# Patient Record
Sex: MALE | Race: ASIAN | Employment: OTHER | URBAN - METROPOLITAN AREA
[De-identification: names, ages, dates, MRNs, and addresses within clinical notes are randomized per-mention and may not be internally consistent; named-entity substitution may affect disease eponyms.]

---

## 2017-01-19 PROBLEM — G95.89 LUMBAR EPIDURAL MASS (HCC): Status: ACTIVE | Noted: 2017-01-19

## 2017-01-19 PROBLEM — M48.062 LUMBAR STENOSIS WITH NEUROGENIC CLAUDICATION: Status: ACTIVE | Noted: 2017-01-19

## 2017-03-15 ENCOUNTER — HOSPITAL ENCOUNTER (EMERGENCY)
Age: 67
Discharge: HOME | End: 2017-03-15
Attending: EMERGENCY MEDICINE
Payer: MEDICARE

## 2017-03-15 VITALS
BODY MASS INDEX: 33.6 KG/M2 | WEIGHT: 240 LBS | DIASTOLIC BLOOD PRESSURE: 98 MMHG | RESPIRATION RATE: 16 BRPM | HEART RATE: 99 BPM | SYSTOLIC BLOOD PRESSURE: 136 MMHG | OXYGEN SATURATION: 95 % | HEIGHT: 71 IN | TEMPERATURE: 98.3 F

## 2017-03-15 DIAGNOSIS — S61.219A LACERATION OF FINGER, INITIAL ENCOUNTER: Primary | ICD-10-CM

## 2017-03-15 PROCEDURE — 99282 EMERGENCY DEPT VISIT SF MDM: CPT

## 2017-03-15 PROCEDURE — 12002 RPR S/N/AX/GEN/TRNK2.6-7.5CM: CPT

## 2017-03-15 PROCEDURE — 6370000000 HC RX 637 (ALT 250 FOR IP): Performed by: EMERGENCY MEDICINE

## 2017-03-15 PROCEDURE — 2500000003 HC RX 250 WO HCPCS: Performed by: EMERGENCY MEDICINE

## 2017-03-15 RX ORDER — CEPHALEXIN 500 MG/1
500 CAPSULE ORAL ONCE
Status: COMPLETED | OUTPATIENT
Start: 2017-03-15 | End: 2017-03-15

## 2017-03-15 RX ORDER — LIDOCAINE HYDROCHLORIDE 10 MG/ML
5 INJECTION, SOLUTION INFILTRATION; PERINEURAL ONCE
Status: COMPLETED | OUTPATIENT
Start: 2017-03-15 | End: 2017-03-15

## 2017-03-15 RX ADMIN — LIDOCAINE HYDROCHLORIDE 5 ML: 10 INJECTION, SOLUTION INFILTRATION; PERINEURAL at 21:49

## 2017-03-15 RX ADMIN — CEPHALEXIN 500 MG: 500 CAPSULE ORAL at 22:04

## 2017-03-15 ASSESSMENT — PAIN DESCRIPTION - LOCATION: LOCATION: FINGER (COMMENT WHICH ONE)

## 2017-03-15 ASSESSMENT — PAIN DESCRIPTION - DESCRIPTORS: DESCRIPTORS: OTHER (COMMENT)

## 2017-03-15 ASSESSMENT — ENCOUNTER SYMPTOMS
ABDOMINAL PAIN: 0
EYE REDNESS: 0
EYE PAIN: 0
VOMITING: 0
SHORTNESS OF BREATH: 0
RHINORRHEA: 0
COLOR CHANGE: 0
COUGH: 0
BLOOD IN STOOL: 0

## 2017-03-15 ASSESSMENT — PAIN DESCRIPTION - FREQUENCY: FREQUENCY: CONTINUOUS

## 2017-03-15 ASSESSMENT — PAIN SCALES - GENERAL: PAINLEVEL_OUTOF10: 2

## 2017-03-15 ASSESSMENT — PAIN DESCRIPTION - PROGRESSION: CLINICAL_PROGRESSION: GRADUALLY WORSENING

## 2017-03-15 ASSESSMENT — PAIN DESCRIPTION - PAIN TYPE: TYPE: ACUTE PAIN

## 2017-03-15 ASSESSMENT — PAIN DESCRIPTION - ONSET: ONSET: SUDDEN

## 2017-03-15 ASSESSMENT — PAIN DESCRIPTION - ORIENTATION: ORIENTATION: RIGHT

## 2017-04-11 PROBLEM — G89.4 CHRONIC PAIN SYNDROME: Status: ACTIVE | Noted: 2017-04-11

## 2017-04-20 PROBLEM — M17.12 PRIMARY OSTEOARTHRITIS OF LEFT KNEE: Status: ACTIVE | Noted: 2017-04-20

## 2017-04-25 PROBLEM — M47.26 OSTEOARTHRITIS OF SPINE WITH RADICULOPATHY, LUMBAR REGION: Status: ACTIVE | Noted: 2017-04-25

## 2017-04-25 PROBLEM — M51.36 DEGENERATIVE DISC DISEASE, LUMBAR: Status: ACTIVE | Noted: 2017-04-25

## 2017-04-25 PROBLEM — M48.061 FORAMINAL STENOSIS OF LUMBAR REGION: Status: ACTIVE | Noted: 2017-04-25

## 2021-06-17 DIAGNOSIS — M48.061 FORAMINAL STENOSIS OF LUMBAR REGION: ICD-10-CM

## 2021-06-17 DIAGNOSIS — G89.4 CHRONIC PAIN SYNDROME: ICD-10-CM

## 2021-06-17 DIAGNOSIS — M47.26 OSTEOARTHRITIS OF SPINE WITH RADICULOPATHY, LUMBAR REGION: ICD-10-CM

## 2021-06-17 DIAGNOSIS — M48.061 SPINAL STENOSIS, LUMBAR REGION, WITHOUT NEUROGENIC CLAUDICATION: ICD-10-CM

## 2021-06-17 DIAGNOSIS — M17.12 PRIMARY OSTEOARTHRITIS OF LEFT KNEE: ICD-10-CM

## 2021-06-17 RX ORDER — HYDROCODONE BITARTRATE AND ACETAMINOPHEN 10; 325 MG/1; MG/1
1 TABLET ORAL
Qty: 70 TABLET | Refills: 0 | Status: SHIPPED | OUTPATIENT
Start: 2021-06-17 | End: 2021-07-20 | Stop reason: SDUPTHER

## 2021-06-17 RX ORDER — GABAPENTIN 600 MG/1
600 TABLET ORAL 2 TIMES DAILY
Qty: 60 TABLET | Refills: 0 | Status: SHIPPED | OUTPATIENT
Start: 2021-06-17 | End: 2021-07-20 | Stop reason: SDUPTHER

## 2021-06-22 ENCOUNTER — VIRTUAL VISIT (OUTPATIENT)
Dept: NEUROSURGERY | Age: 71
End: 2021-06-22
Payer: MEDICARE

## 2021-06-22 DIAGNOSIS — M47.26 OSTEOARTHRITIS OF SPINE WITH RADICULOPATHY, LUMBAR REGION: Primary | ICD-10-CM

## 2021-06-22 DIAGNOSIS — M25.512 ACUTE PAIN OF LEFT SHOULDER: ICD-10-CM

## 2021-06-22 PROCEDURE — 99442 PR PHYS/QHP TELEPHONE EVALUATION 11-20 MIN: CPT | Performed by: NURSE PRACTITIONER

## 2021-06-22 ASSESSMENT — ENCOUNTER SYMPTOMS
RESPIRATORY NEGATIVE: 1
BACK PAIN: 1
ABDOMINAL PAIN: 0
COLOR CHANGE: 0

## 2021-06-22 NOTE — PROGRESS NOTES
Lack of Transportation (Medical):  Lack of Transportation (Non-Medical):    Physical Activity:     Days of Exercise per Week:     Minutes of Exercise per Session:    Stress:     Feeling of Stress :    Social Connections:     Frequency of Communication with Friends and Family:     Frequency of Social Gatherings with Friends and Family:     Attends Religion Services:     Active Member of Clubs or Organizations:     Attends Club or Organization Meetings:     Marital Status:    Intimate Partner Violence:     Fear of Current or Ex-Partner:     Emotionally Abused:     Physically Abused:     Sexually Abused:        Current Outpatient Medications on File Prior to Visit   Medication Sig Dispense Refill    HYDROcodone-acetaminophen (Randine Ped)  MG per tablet Take 1 tablet by mouth every 8-12 hours as needed for Pain for up to 30 days. 70 tablet 0    gabapentin (NEURONTIN) 600 MG tablet Take 1 tablet by mouth 2 times daily for 30 days. 60 tablet 0    naloxone 4 MG/0.1ML LIQD nasal spray 1 spray by Nasal route as needed for Opioid Reversal 1 each 0    prednisoLONE acetate (PRED FORTE) 1 % ophthalmic suspension SHAKE LQ AND INT 1 GTT IN OD QID  2    trimethoprim-polymyxin b (POLYTRIM) 15538-2.1 UNIT/ML-% ophthalmic solution USE 1 GTT IN THE RIGHT EYE QID. BEGIN THE DAY BEFORE SURGERY  1    pioglitazone (ACTOS) 30 MG tablet       omeprazole (PRILOSEC) 40 MG delayed release capsule TK ONE C PO  QD  2    metFORMIN (GLUCOPHAGE) 1000 MG tablet TK ONE T PO BID WITH MEALS  2    lisinopril (PRINIVIL;ZESTRIL) 20 MG tablet TK 1 T PO QD  2    ketorolac (ACULAR) 0.5 % ophthalmic solution USE 1 GTT IN THE RIGHT EYE BID.  BEGIN 5 DAYS BEFORE SURGERY  2    ACCU-CHEK NAN PLUS strip TEST BID  3    fluticasone (FLONASE) 50 MCG/ACT nasal spray USE 2 SPRAYS IN EACH NOSTRIL QD  2    doxycycline hyclate (VIBRAMYCIN) 100 MG capsule TK 1 C PO QD  1    citalopram (CELEXA) 10 MG tablet       atorvastatin (LIPITOR) 20 MG tablet       warfarin (COUMADIN) 5 MG tablet Mondays and Fridays 7.5mg, rest of week 10mg       No current facility-administered medications on file prior to visit. He  tried physical therapy. Date of lastof last PT treatment:     Pt presents today for a f/u of his chronic low back pain and left shoulder pain. Can't lay on left side due to back and shoulder. He moved into a new house with his wife, recently remarried. Is living in [place] now and has a new primary care physician who has referred him to Ortho for his left shoulder. He did some lumbar PT earlier this year. Pain 4/10, and 10/10 without medication. Pt feels that bending/stooping makes the pain worse, and laying down makes the pain better. Pt feels his medication helps him function and improve his quality of life. Pt c/o longstanding but intermittent BLE radiating numbness and tingling. Denies recent falls, injuries or trauma. Pt denies new weakness. History of  brain tumor, CVA, PE and continues on chronic coumadin. Had appt with Dr. Fernando Curran  at [facility] for bilateral SI injections, somewhat helpful but short term only. Updated XR report of lumbar spine in Care Everywhere shows mild degenerative changes and intact fusion. No longer seeing Dr. Leeann Wright.         PSH: PLIF L4-5 and 5-S1 with Dr. Mary Flynn on 5/10/17;  back surgery with Dr. Leeann Wright August 2018,   hernia repair surgery in June 2018, bilateral knee replacement Dec 2018       Shoulder Pain   Pertinent negatives include no numbness. Review of Systems   Constitutional: Negative. Negative for activity change and unexpected weight change. HENT: Negative. Respiratory: Negative. Gastrointestinal: Negative for abdominal pain. Genitourinary: Negative. Musculoskeletal: Positive for back pain. Negative for gait problem, joint swelling, neck pain and neck stiffness. Left shoulder   Skin: Negative for color change and rash. Neurological: Negative.   Negative for dizziness, seizures, weakness, light-headedness, numbness and headaches. Psychiatric/Behavioral: Negative. Negative for sleep disturbance. Objective:     Vitals: There were no vitals taken for this visit. PHYSICAL EXAMINATION:    [x] Alert  [x] Oriented to person/place/time  [x] No apparent distress      [x] Mood and affect normal  [x] Recent and remote memory intact  [x] Judgement and insight normal     [] OTHER:      Due to this being a TeleHealth encounter, evaluation of the following organ systems is limited: Vitals/Constitutional/EENT/Resp/CV/GI//MS/Neuro/Skin/Heme-Lymph-Imm. Assessment:      Diagnosis Orders   1. Osteoarthritis of spine with radiculopathy, lumbar region     2. Acute pain of left shoulder         Plan:          No orders of the defined types were placed in this encounter.    -He is living [facility] about a 3-hour drive. he will check with his family physician for a pain management referral.  He understands that he will need to come here monthly and it is too far  -He just had his refill on 6/17/2021 so does not need it today  -We will talk to him next month  No orders of the defined types were placed in this encounter. Discussed options with the patient today. Anatomic model pathology was shown and reviewed with pt. All questions were answered. Relevant imaging reviewed, NDP reviewed and pain generators reviewed. Pt verbalized understanding and agrees with above plan. Will continue medications as they do help pt function with ADL and improve quality of life. Pt understands potential side effects from opioids such as constipation, dry mouth, dizziness, opioid use disorder, and overdose. Pt has failed non opioid therapy and decision was made to prescribe opioids to help with daily function and improve quality of life. OARRS was reviewed. UTOX from 1/2021 appropriate; ORT score = 0  Daily MME 23  This NP saw pt under direct supervision of Dr Elton Busby. Controlled Substances Monitoring: Follow up:  Return in about 4 weeks (around 7/20/2021) for review meds and reassess pain. Andres Stallinsg, MAI - CNP      >50% of 11 minute appointment was spent with discussion, addressing questions and concerns, including prognosis, treatment options, patient education, and recommendations. [id number]    Pursuant to the emergency declaration under the [facility], [facility] waiver authority and the [employer] Act, this Virtual  Visit was conducted, with patient's consent, to reduce the patient's risk of exposure to COVID-19 and provide continuity of care for an established patient. Services were provided through an audio discussion to substitute for in-person clinic visit.

## 2021-07-20 ENCOUNTER — VIRTUAL VISIT (OUTPATIENT)
Dept: NEUROSURGERY | Age: 71
End: 2021-07-20
Payer: MEDICARE

## 2021-07-20 DIAGNOSIS — M48.061 FORAMINAL STENOSIS OF LUMBAR REGION: ICD-10-CM

## 2021-07-20 DIAGNOSIS — M17.12 PRIMARY OSTEOARTHRITIS OF LEFT KNEE: ICD-10-CM

## 2021-07-20 DIAGNOSIS — G89.4 CHRONIC PAIN SYNDROME: ICD-10-CM

## 2021-07-20 DIAGNOSIS — M48.061 SPINAL STENOSIS, LUMBAR REGION, WITHOUT NEUROGENIC CLAUDICATION: ICD-10-CM

## 2021-07-20 DIAGNOSIS — M47.26 OSTEOARTHRITIS OF SPINE WITH RADICULOPATHY, LUMBAR REGION: ICD-10-CM

## 2021-07-20 PROCEDURE — 99441 PR PHYS/QHP TELEPHONE EVALUATION 5-10 MIN: CPT | Performed by: NURSE PRACTITIONER

## 2021-07-20 RX ORDER — HYDROCODONE BITARTRATE AND ACETAMINOPHEN 10; 325 MG/1; MG/1
1 TABLET ORAL
Qty: 70 TABLET | Refills: 0 | Status: SHIPPED | OUTPATIENT
Start: 2021-07-20 | End: 2021-08-18 | Stop reason: SDUPTHER

## 2021-07-20 RX ORDER — GABAPENTIN 600 MG/1
600 TABLET ORAL 2 TIMES DAILY
Qty: 60 TABLET | Refills: 0 | Status: SHIPPED | OUTPATIENT
Start: 2021-07-20 | End: 2021-08-18 | Stop reason: SDUPTHER

## 2021-07-20 ASSESSMENT — ENCOUNTER SYMPTOMS
BACK PAIN: 1
RESPIRATORY NEGATIVE: 1
COLOR CHANGE: 0
ABDOMINAL PAIN: 0

## 2021-07-20 NOTE — PROGRESS NOTES
Conchis Martinez  (1950)    7/20/2021    TELEHEALTH EVALUATION -- Audio Only (During XWBUP-62 public health emergency)    Due to [place] 19 outbreak, patient's office visit was converted to a virtual visit. Patient was contacted and agreed to proceed with a audio only telehealth service. Patient understands that this encounter is a billable visit and that insurance coverage and co-pays are up to their individual insurance plans. At the beginning of this telehealth encounter, I verified with the patient their name and date of birth. Verbal consent for telehealth encounter was obtained. Subjective:       Chief Complaint   Patient presents with    Back Pain       Conchis Martinez is 66 y.o. male who complains today of: Allergies:  Clarithromycin    History:  Past Medical History:   Diagnosis Date    Pulmonary embolism ([address]     Spinal stenosis      Past Surgical History:   Procedure Laterality Date    COLONOSCOPY      EYE SURGERY      JOINT REPLACEMENT      VASCULAR SURGERY       No family history on file. Social History     Socioeconomic History    Marital status:      Spouse name: Not on file    Number of children: Not on file    Years of education: Not on file    Highest education level: Not on file   Occupational History    Not on file   Tobacco Use    Smoking status: Never Smoker    Smokeless tobacco: Never Used   Substance and Sexual Activity    Alcohol use: Yes     Alcohol/week: 0.0 standard drinks     Comment: rarely    Drug use: No    Sexual activity: Not on file   Other Topics Concern    Not on file   Social History Narrative    Not on file     Social Determinants of Health     Financial Resource Strain:     Difficulty of Paying Living Expenses:    Food Insecurity:     Worried About Running Out of Food in the Last Year:     [address] in the Last Year:    Transportation Needs:     Lack of Transportation (Medical):      Lack of Transportation (Non-Medical):    Physical Activity:     Days of Exercise per Week:     Minutes of Exercise per Session:    Stress:     Feeling of Stress :    Social Connections:     Frequency of Communication with Friends and Family:     Frequency of Social Gatherings with Friends and Family:     Attends Judaism Services:     Active Member of Clubs or Organizations:     Attends Club or Organization Meetings:     Marital Status:    Intimate Partner Violence:     Fear of Current or Ex-Partner:     Emotionally Abused:     Physically Abused:     Sexually Abused:        Current Outpatient Medications on File Prior to Visit   Medication Sig Dispense Refill    naloxone 4 MG/0.1ML LIQD nasal spray 1 spray by Nasal route as needed for Opioid Reversal 1 each 0    prednisoLONE acetate (PRED FORTE) 1 % ophthalmic suspension SHAKE LQ AND INT 1 GTT IN OD QID  2    trimethoprim-polymyxin b (POLYTRIM) 50007-1.1 UNIT/ML-% ophthalmic solution USE 1 GTT IN THE RIGHT EYE QID. BEGIN THE DAY BEFORE SURGERY  1    pioglitazone (ACTOS) 30 MG tablet       omeprazole (PRILOSEC) 40 MG delayed release capsule TK ONE C PO  QD  2    metFORMIN (GLUCOPHAGE) 1000 MG tablet TK ONE T PO BID WITH MEALS  2    lisinopril (PRINIVIL;ZESTRIL) 20 MG tablet TK 1 T PO QD  2    ketorolac (ACULAR) 0.5 % ophthalmic solution USE 1 GTT IN THE RIGHT EYE BID. BEGIN 5 DAYS BEFORE SURGERY  2    ACCU-CHEK NAN PLUS strip TEST BID  3    fluticasone (FLONASE) 50 MCG/ACT nasal spray USE 2 SPRAYS IN EACH NOSTRIL QD  2    doxycycline hyclate (VIBRAMYCIN) 100 MG capsule TK 1 C PO QD  1    citalopram (CELEXA) 10 MG tablet       atorvastatin (LIPITOR) 20 MG tablet       warfarin (COUMADIN) 5 MG tablet Mondays and Fridays 7.5mg, rest of week 10mg       No current facility-administered medications on file prior to visit. He  tried physical therapy.    Date of lastof last PT treatment:     Pt presents today for a f/u of his chronic low back pain and left shoulder pain. Had shoulder MRI and will be getting results soon at a provider near [place]. He says this is all after a flu vaccine done in 2019. Can't lay on left side due to back and shoulder. He moved into a new house with his wife, recently remarried. Is living near [facility] now and has a new primary care physician who has referred him to Ortho for his left shoulder. He did some lumbar PT earlier this year. Pain 4/10, and 10/10 without medication. Pt feels that bending/stooping makes the pain worse, and laying down makes the pain better. Pt feels his medication helps him function and improve his quality of life. Pt c/o longstanding but intermittent BLE radiating numbness and tingling. Denies recent falls, injuries or trauma. Pt denies new weakness.      History of  brain tumor, CVA, PE and continues on chronic coumadin. Had appt with Dr. Johnny Blanco  at [facility] for bilateral SI injections, somewhat helpful but short term only. Updated XR report of lumbar spine in Care Everywhere shows mild degenerative changes and intact fusion. No longer seeing Dr. Miah Harris.          PSH: PLIF L4-5 and 5-S1 with Dr. La Mcdonnell on 5/10/17;  back surgery with Dr. Miah Harris August 2018,   hernia repair surgery in June 2018, bilateral knee replacement Dec 2018    Back Pain  Pertinent negatives include no abdominal pain, headaches, numbness or weakness. Review of Systems   Constitutional: Negative. Negative for activity change and unexpected weight change. HENT: Negative. Respiratory: Negative. Gastrointestinal: Negative for abdominal pain. Genitourinary: Negative. Musculoskeletal: Positive for back pain. Negative for gait problem, joint swelling, neck pain and neck stiffness. Left shoulder   Skin: Negative for color change and rash. Neurological: Negative. Negative for dizziness, seizures, weakness, light-headedness, numbness and headaches. Psychiatric/Behavioral: Negative.   Negative for sleep disturbance. Objective:     Vitals: There were no vitals taken for this visit. PHYSICAL EXAMINATION:    [x] Alert  [x] Oriented to person/place/time  [x] No apparent distress      [x] Mood and affect normal  [x] Recent and remote memory intact  [x] Judgement and insight normal     [] OTHER:      Due to this being a TeleHealth encounter, evaluation of the following organ systems is limited: Vitals/Constitutional/EENT/Resp/CV/GI//MS/Neuro/Skin/Heme-Lymph-Imm. Assessment:      Diagnosis Orders   1. Primary osteoarthritis of left knee  HYDROcodone-acetaminophen (NORCO)  MG per tablet   2. Chronic pain syndrome  HYDROcodone-acetaminophen (NORCO)  MG per tablet   3. Spinal stenosis, lumbar region, without neurogenic claudication  HYDROcodone-acetaminophen (NORCO)  MG per tablet   4. Osteoarthritis of spine with radiculopathy, lumbar region  HYDROcodone-acetaminophen (NORCO)  MG per tablet    gabapentin (NEURONTIN) 600 MG tablet   5. Foraminal stenosis of lumbar region  gabapentin (NEURONTIN) 600 MG tablet       Plan:          Orders Placed This Encounter   Medications    HYDROcodone-acetaminophen (NORCO)  MG per tablet     Sig: Take 1 tablet by mouth every 8-12 hours as needed for Pain for up to 30 days. Dispense:  70 tablet     Refill:  0     Reduce doses taken as pain becomes manageable    gabapentin (NEURONTIN) 600 MG tablet     Sig: Take 1 tablet by mouth 2 times daily for 30 days. Dispense:  60 tablet     Refill:  0       No orders of the defined types were placed in this encounter.    -He is living [facility] about a 3-hour drive. he will check with his family physician for a pain management referral.  He understands that he will need to come here monthly and it is too far  -Refill Norco 10 mg every 8-12 hours as needed pain, #70  Refill gabapentin 600 mg twice daily  -He has follow-up with Ortho for his left shoulder.   After that he would like to see if there is anything that can be done for his back  -We will talk to him next month  -Still has not found pain management near [facility] where he is now living  No orders of the defined types were placed in this encounter.     Discussed options with the patient today. Anatomic model pathology was shown and reviewed with pt. All questions were answered. Relevant imaging reviewed, NDP reviewed and pain generators reviewed. Pt verbalized understanding and agrees with above plan. Will continue medications as they do help pt function with ADL and improve quality of life. Pt understands potential side effects from opioids such as constipation, dry mouth, dizziness, opioid use disorder, and overdose. Pt has failed non opioid therapy and decision was made to prescribe opioids to help with daily function and improve quality of life.     OARRS was reviewed. UTOX from 1/2021 appropriate; ORT score = 0  Daily MME 23  This NP saw pt under direct supervision of Dr Susie Landeros.           Follow up:  Return in about 4 weeks (around 8/17/2021) for F/U Dr Susie Landeros. Ravin Varner, MAI - CNP      >50% of 10 minute appointment was spent with discussion, addressing questions and concerns, including prognosis, treatment options, patient education, and recommendations. [id number]    Pursuant to the emergency declaration under the [facility] waiver authority and the [employer] Act, this Virtual  Visit was conducted, with patient's consent, to reduce the patient's risk of exposure to COVID-19 and provide continuity of care for an established patient. Services were provided through an audio discussion to substitute for in-person clinic visit.

## 2021-08-18 ENCOUNTER — VIRTUAL VISIT (OUTPATIENT)
Dept: PAIN MANAGEMENT | Age: 71
End: 2021-08-18
Payer: MEDICARE

## 2021-08-18 DIAGNOSIS — M51.36 DDD (DEGENERATIVE DISC DISEASE), LUMBAR: Primary | ICD-10-CM

## 2021-08-18 DIAGNOSIS — M48.061 SPINAL STENOSIS, LUMBAR REGION, WITHOUT NEUROGENIC CLAUDICATION: ICD-10-CM

## 2021-08-18 DIAGNOSIS — M47.26 OSTEOARTHRITIS OF SPINE WITH RADICULOPATHY, LUMBAR REGION: ICD-10-CM

## 2021-08-18 DIAGNOSIS — G89.4 CHRONIC PAIN SYNDROME: ICD-10-CM

## 2021-08-18 DIAGNOSIS — M17.12 PRIMARY OSTEOARTHRITIS OF LEFT KNEE: ICD-10-CM

## 2021-08-18 DIAGNOSIS — M48.061 FORAMINAL STENOSIS OF LUMBAR REGION: ICD-10-CM

## 2021-08-18 DIAGNOSIS — M47.817 LUMBOSACRAL SPONDYLOSIS WITHOUT MYELOPATHY: ICD-10-CM

## 2021-08-18 PROCEDURE — 99442 PR PHYS/QHP TELEPHONE EVALUATION 11-20 MIN: CPT | Performed by: PAIN MEDICINE

## 2021-08-18 RX ORDER — HYDROCODONE BITARTRATE AND ACETAMINOPHEN 10; 325 MG/1; MG/1
1 TABLET ORAL EVERY 8 HOURS PRN
Qty: 90 TABLET | Refills: 0 | Status: SHIPPED | OUTPATIENT
Start: 2021-08-18 | End: 2021-09-16 | Stop reason: SDUPTHER

## 2021-08-18 RX ORDER — GABAPENTIN 600 MG/1
600 TABLET ORAL 2 TIMES DAILY
Qty: 60 TABLET | Refills: 0 | Status: SHIPPED | OUTPATIENT
Start: 2021-08-18 | End: 2021-09-16 | Stop reason: SDUPTHER

## 2021-09-16 ENCOUNTER — VIRTUAL VISIT (OUTPATIENT)
Dept: PAIN MANAGEMENT | Age: 71
End: 2021-09-16
Payer: MEDICARE

## 2021-09-16 ENCOUNTER — TELEPHONE (OUTPATIENT)
Dept: PAIN MANAGEMENT | Age: 71
End: 2021-09-16

## 2021-09-16 DIAGNOSIS — M48.061 SPINAL STENOSIS, LUMBAR REGION, WITHOUT NEUROGENIC CLAUDICATION: ICD-10-CM

## 2021-09-16 DIAGNOSIS — M48.061 FORAMINAL STENOSIS OF LUMBAR REGION: ICD-10-CM

## 2021-09-16 DIAGNOSIS — G89.4 CHRONIC PAIN SYNDROME: ICD-10-CM

## 2021-09-16 DIAGNOSIS — M47.26 OSTEOARTHRITIS OF SPINE WITH RADICULOPATHY, LUMBAR REGION: ICD-10-CM

## 2021-09-16 DIAGNOSIS — M17.12 PRIMARY OSTEOARTHRITIS OF LEFT KNEE: ICD-10-CM

## 2021-09-16 PROBLEM — F51.04 PSYCHOPHYSIOLOGICAL INSOMNIA: Status: ACTIVE | Noted: 2020-07-17

## 2021-09-16 PROBLEM — M54.40 CHRONIC BILATERAL LOW BACK PAIN WITH SCIATICA: Status: ACTIVE | Noted: 2019-06-04

## 2021-09-16 PROBLEM — E11.65 TYPE 2 DIABETES MELLITUS WITH HYPERGLYCEMIA, WITHOUT LONG-TERM CURRENT USE OF INSULIN (HCC): Status: ACTIVE | Noted: 2021-05-12

## 2021-09-16 PROBLEM — K21.9 GERD WITHOUT ESOPHAGITIS: Status: ACTIVE | Noted: 2019-02-04

## 2021-09-16 PROBLEM — F33.41 RECURRENT MAJOR DEPRESSIVE DISORDER, IN PARTIAL REMISSION (HCC): Status: ACTIVE | Noted: 2020-07-17

## 2021-09-16 PROBLEM — I82.409 DVT OF LOWER EXTREMITY (DEEP VENOUS THROMBOSIS) (HCC): Status: ACTIVE | Noted: 2021-05-12

## 2021-09-16 PROBLEM — N13.8 BPH WITH OBSTRUCTION/LOWER URINARY TRACT SYMPTOMS: Status: ACTIVE | Noted: 2018-05-03

## 2021-09-16 PROBLEM — I82.90 VENOUS THROMBOEMBOLISM (VTE): Status: ACTIVE | Noted: 2021-05-28

## 2021-09-16 PROBLEM — N40.1 BPH WITH OBSTRUCTION/LOWER URINARY TRACT SYMPTOMS: Status: ACTIVE | Noted: 2018-05-03

## 2021-09-16 PROBLEM — F41.9 ANXIETY DISORDER, UNSPECIFIED: Status: ACTIVE | Noted: 2018-08-13

## 2021-09-16 PROBLEM — G89.29 CHRONIC BILATERAL LOW BACK PAIN WITH SCIATICA: Status: ACTIVE | Noted: 2019-06-04

## 2021-09-16 PROBLEM — R06.09 DOE (DYSPNEA ON EXERTION): Status: ACTIVE | Noted: 2021-05-28

## 2021-09-16 PROBLEM — Z86.711 HISTORY OF PULMONARY EMBOLISM: Status: ACTIVE | Noted: 2018-12-10

## 2021-09-16 PROBLEM — I10 ESSENTIAL HYPERTENSION: Status: ACTIVE | Noted: 2018-08-13

## 2021-09-16 PROBLEM — E78.5 HYPERLIPIDEMIA WITH TARGET LDL LESS THAN 70: Status: ACTIVE | Noted: 2021-09-16

## 2021-09-16 PROBLEM — N32.81 OAB (OVERACTIVE BLADDER): Status: ACTIVE | Noted: 2021-05-12

## 2021-09-16 PROBLEM — I26.94 MULTIPLE SUBSEGMENTAL PULMONARY EMBOLI WITHOUT ACUTE COR PULMONALE (HCC): Status: ACTIVE | Noted: 2021-05-12

## 2021-09-16 PROBLEM — E66.9 OBESITY, CLASS I, BMI 30-34.9: Status: ACTIVE | Noted: 2018-08-06

## 2021-09-16 PROCEDURE — 99442 PR PHYS/QHP TELEPHONE EVALUATION 11-20 MIN: CPT | Performed by: NURSE PRACTITIONER

## 2021-09-16 RX ORDER — HYDROCODONE BITARTRATE AND ACETAMINOPHEN 10; 325 MG/1; MG/1
1 TABLET ORAL EVERY 8 HOURS PRN
Qty: 90 TABLET | Refills: 0 | Status: SHIPPED | OUTPATIENT
Start: 2021-09-17 | End: 2021-10-17

## 2021-09-16 RX ORDER — GABAPENTIN 600 MG/1
600 TABLET ORAL 2 TIMES DAILY
Qty: 60 TABLET | Refills: 0 | Status: SHIPPED | OUTPATIENT
Start: 2021-09-17 | End: 2021-10-17

## 2021-09-16 ASSESSMENT — ENCOUNTER SYMPTOMS
NAUSEA: 0
EYES NEGATIVE: 1
COUGH: 0
DIARRHEA: 0
CONSTIPATION: 0
GASTROINTESTINAL NEGATIVE: 1
SHORTNESS OF BREATH: 0
BACK PAIN: 1

## 2021-09-16 NOTE — TELEPHONE ENCOUNTER
Patient had a follow up apt with Damon Witt today, 09/16/21. He stated that for his last prescription for gabapentin he received a call from his pharmacy stating the insurance would not approve it. I told patient that I would review. I called patient's pharmacy of [facility] in [place] [phone]. I was told that patient's gabapentin went through his insurance and is copay is $2.    I called patient and made him aware.

## 2021-09-16 NOTE — PROGRESS NOTES
Christiano Boone  (1950)    9/16/2021    TELEHEALTH EVALUATION -- Audio Only (During EPJCI-00 public health emergency)    Due to [place] 19 outbreak, patient's office visit was converted to a virtual visit. Patient was contacted and agreed to proceed with a audio only telehealth service. Patient understands that this encounter is a billable visit and that insurance coverage and co-pays are up to their individual insurance plans. At the beginning of this telehealth encounter, I verified with the patient their name and date of birth. Verbal consent for telehealth encounter was obtained. Subjective:       Chief Complaint   Patient presents with    Shoulder Pain     left, having surgery in a couple weeks.  Back Pain     lumbar    Knee Pain     left       Christiano Boone is 66 y.o. male who complains today of: Allergies:  Clarithromycin    History:  Past Medical History:   Diagnosis Date    Pulmonary embolism ([address]     Spinal stenosis      Past Surgical History:   Procedure Laterality Date    COLONOSCOPY      EYE SURGERY      JOINT REPLACEMENT      VASCULAR SURGERY       No family history on file. Social History     Socioeconomic History    Marital status:      Spouse name: Not on file    Number of children: Not on file    Years of education: Not on file    Highest education level: Not on file   Occupational History    Not on file   Tobacco Use    Smoking status: Never Smoker    Smokeless tobacco: Never Used   Substance and Sexual Activity    Alcohol use:  Yes     Alcohol/week: 0.0 standard drinks     Comment: rarely    Drug use: No    Sexual activity: Not on file   Other Topics Concern    Not on file   Social History Narrative    Not on file     Social Determinants of Health     Financial Resource Strain:     Difficulty of Paying Living Expenses:    Food Insecurity:     Worried About Running Out of Food in the Last Year:     [address] in the Last Year: SURGERY (Patient not taking: Reported on 9/16/2021)  1    ketorolac (ACULAR) 0.5 % ophthalmic solution USE 1 GTT IN THE RIGHT EYE BID. BEGIN 5 DAYS BEFORE SURGERY (Patient not taking: Reported on 9/16/2021)  2    atorvastatin (LIPITOR) 20 MG tablet  (Patient not taking: Reported on 9/16/2021)       No current facility-administered medications on file prior to visit. Pt's f/u done today with a telehealth visit for his pain d/t Covid 19 pandemic. [facility] is [facility]. Pt last saw Dr. Viktoria Haq for his chronic low back pain and left shoulder pain. He did see ortho and says will be having shoulder surgery on October 2nd in [facility] he reports. He moved into a new house with his wife, recently remarried. Is living near [place] an having trouble finding new pain management. He did some lumbar PT earlier this year.      History of  brain tumor, CVA, PE and continues on chronic coumadin. Had appt with Dr. Marlen Lange  at [facility] for bilateral SI injections, somewhat helpful but short term only. Updated XR report of lumbar spine in [facility] shows mild degenerative changes and intact fusion. No longer seeing Dr. Don Espitia.        PSH: PLIF L4-5 and 5-S1 with Dr. Benjamin Duff on 5/10/17;  back surgery with Dr. Don Espitia August 2018,   hernia repair surgery in June 2018, bilateral knee replacement Dec 2018. Takes Norco 10mg Q6hrs PRN pain and gabapentin 600mg BID to help with ADLS and better quality of life. Pt feels pain level with his medication is 2/10, and \"really hurts\" without medication. Pt feels that bending, stooping over, prolonged standing or walking, cutting grass/riding on  makes the pain worse, and medication, laying down makes the pain better. Pt feels his medication helps   him function and improve his quality of life, specifically says allows to do ADL's. Pt denies radiating numbness and tingling. Denies recent falls, injuries or trauma. Pt denies new weakness.  Pt reports PT has been done in the past.           Review of Systems   Constitutional: Negative for fever. HENT: Negative. Eyes: Negative. Respiratory: Negative for cough and shortness of breath. Cardiovascular: Negative for chest pain. Gastrointestinal: Negative. Negative for constipation, diarrhea and nausea. Endocrine: Negative. Genitourinary: Negative. Musculoskeletal: Positive for arthralgias and back pain. Negative for neck pain. Skin: Negative. Neurological: Negative for dizziness, weakness and numbness. Psychiatric/Behavioral: Negative. Objective:     Vitals: There were no vitals taken for this visit. PHYSICAL EXAMINATION:    [x] Alert  [x] Oriented to person/place/time  [x] No apparent distress      [x] Mood and affect normal  [x] Recent and remote memory intact  [x] Judgement and insight normal     [] OTHER:      Due to this being a TeleHealth encounter, evaluation of the following organ systems is limited: Vitals/Constitutional/EENT/Resp/CV/GI//MS/Neuro/Skin/Heme-Lymph-Imm. Assessment:      Diagnosis Orders   1. Chronic pain syndrome     2. Primary osteoarthritis of left knee     3. Spinal stenosis, lumbar region, without neurogenic claudication     4. Osteoarthritis of spine with radiculopathy, lumbar region     5. Foraminal stenosis of lumbar region         Plan:     Periodic Controlled Substance Monitoring: Possible medication side effects, risk of tolerance/dependence & alternative treatments discussed., No signs of potential drug abuse or diversion identified. , Assessed functional status., Obtaining appropriate analgesic effect of treatment. (Jenny Xavier, APRN - CNP)    No orders of the defined types were placed in this encounter. No orders of the defined types were placed in this encounter. Discussed options with the patient today. Telehealth visit d/t COVID-19 as noted above.   Jh with patient and encouraged him he needs to find a provider for pain management closer to where he lives near [place]. We can give him referrals if needed if you find someone. Encouraged him to discuss this further with PCP. Evidently he is having left shoulder surgery at the beginning of October. At this time we will continue his Norco 10 mg 3 a day as needed pain and his gabapentin 600 mg twice a day. Hold off on injections at this time. Vies patient would like to evaluate him in the office however can do telemedicine appointment next month due to his surgery but plan is to see him in the office in November. Patient verbalized understanding and agreed to above plan. All questions were answered. Discussed home exercise program and  smoking cessation. Relevant imaging and pain generators reviewed. Pt verbalized understanding and agrees with above plan. Pt has chronic pain. Utox reviewed and appropriate from January 2021. ORT score 0 - low risk. Narcan Rx sent in October 2020    Will continue medications for chronic pain that has been previously directed as they do help pt function with ADL and improve quality of life. Pt is aware goal is to use the least amount of medication possible to allow pt to function and help with quality of life as discussed in detail. Discussed ideal to keep MME below 50 which it is. Discussed proper disposal of narcotic medication. Advised to have medications in safe place and locked up/in lock box. Discussed possible risks of opiate medication with pt, including, but not limited to possible constipation, nausea or vomiting, sedation, urinary retention, dependence and possible addiction. Pt agrees to use medication as prescribed/as directed. Pt advised to not use opiates while driving or operating heavy equipment, or in situations where pt may harm him/herself or others. Pt is aware that while on narcotics, pt needs to be seen to reassess pain and reassess need for continued medication at that time. NDP reviewed.   OARRS was reviewed. This NP saw pt under direct supervision of Dr. Hilton Ovalles. Follow up:  Return in about 4 weeks (around 10/14/2021) for review meds and reassess pain. Jenny Whalen, MAI - CNP      >50% of appointment was spent with discussion, addressing questions and concerns, including prognosis, treatment options, patient education, and recommendations. [id number]    Pursuant to the emergency declaration under the [address] [place] [address], [facility], [facility] waiver authority and the [employer] Act, this Virtual  Visit was conducted, with patient's consent, to reduce the patient's risk of exposure to COVID-19 and provide continuity of care for an established patient. Services were provided through an audio discussion to substitute for in-person clinic visit.

## 2021-10-14 ENCOUNTER — TELEPHONE (OUTPATIENT)
Dept: PAIN MANAGEMENT | Age: 71
End: 2021-10-14

## 2021-10-14 NOTE — TELEPHONE ENCOUNTER
Patient had a follow up apt with Jenny today, 10/14/21 at 11:00 am.    I called patient to check him in for his Telehealth call. Patient's grandson answered and stated that patient is driving. He stated that he is in [place] and it will take one hour to get to their destination. I told the grandson that I would need to cancel patient's apt and that he could call us back to reschedule. He agreed. Patient's grandson is not listed on patient's contact information so I called patient's wife, Grace Barraza,  to verify. (on Contact list). Wife did not answer. However I left a voicemail asking her to return our call.

## 2021-11-08 ENCOUNTER — TELEPHONE (OUTPATIENT)
Dept: PAIN MANAGEMENT | Age: 71
End: 2021-11-08

## 2021-11-08 NOTE — TELEPHONE ENCOUNTER
LEFT MESSAGE FOR THE PATIENT THAT I WAS CALLING TO GET HIM PRE CHARTED FOR HIS TELE HEALTH, I WAS UNABLE TO REACH THE PATIENT , THIS APPOINTMENT HAS BEEN CANCELLED

## 2021-11-16 ENCOUNTER — TELEPHONE (OUTPATIENT)
Dept: PAIN MANAGEMENT | Age: 71
End: 2021-11-16